# Patient Record
(demographics unavailable — no encounter records)

---

## 2024-12-06 NOTE — ADDENDUM
[FreeTextEntry1] :  EMMANUEL CUMMINGS, am scribing for and in the presence of  in the following sections: HISTORY OF PRESENT ILLNESS, PAST MEDICAL/FAMILY/SOCIAL HISTORY, REVIEW OF SYSTEMS, VITAL SIGNS, PHYSICAL EXAM, ASSESSMENT/PLAN on 11/26/2024.

## 2024-12-06 NOTE — HISTORY OF PRESENT ILLNESS
[FreeTextEntry1] :  80 year old male patient seen for evaluation of LUTS. He complains primarily of urgency and urge incontinence. He has a previous history of a vaporization of the prostate by Dr. Smith in 2018. He has an MRI from 2 years ago which shows a prostate volume of 71 cc and a PIRADS 2 lesion only. His lab work shows BUN 17, CRT 1.1 from September 2024, PSA of 5.59 from January 2024. His stream varies - sometimes it is quite slow, sometimes better. He has nocturia x3-4. Denies gross hematuria dysuria, or UTIs. His biggest complaint continues to be daytime frequency, urgency, and occasional urge incontinence.   PMHx: CAD on Plavix because of stents, bowel-related issues including constipation which he uses a fair number of prunes

## 2024-12-06 NOTE — ASSESSMENT
[FreeTextEntry1] :  80 year old male patient with BPH, previous history of laser photovaporization, bothersome LUTS primarily urgency and urge incontinence. We discussed this in detail and discussed the pathophysiology of urgency and urge incontinence. I think at this point to get further information, we would proceed with cystoscopy and video urodynamics to define if he has significant obstruction present, what the prostate looks like, and whether he has significant overactivity. We also discussed alternatives including medications and their side effects and other options including PTNS. He decided to proceed with further work-up.  All of the patient's questions were otherwise answered. Total time including chart review was 30 boone.   The submitted E/M billing level for this visit reflects the total time spent on the day of the visit including face-to-face time spent with the patient, non-face-to-face review of medical records and relevant information, documentation, and asynchronous communication with the patient after a visit via phone, email, or patients EHR portal after the visit. The medical records reviewed are either scanned into the chart or reviewed with the patient using a patients electronic medical records portal for patients with records not available to [facility] via electronic transmission platforms from other institutions and labs. Time spent counseling and performing coordination of care was also included in determining the appropriate EM billing level.   I have reviewed and verified information regarding the chief complaint and history recorded by the ancillary staff and/or the patient. I have independently reviewed and interpreted tests performed by other physicians and facilities as necessary.   I have discussed with the patient differential diagnosis, reason for auxiliary tests if ordered, risks, benefits, alternatives, and complications of each form of therapy were discussed.  I personally performed the services described in the documentation, reviewed the documentation recorded by the scribe in my presence, and it accurately and completely records my words and actions.

## 2024-12-06 NOTE — ASSESSMENT
[FreeTextEntry1] :  80 year old male patient with BPH, previous history of laser photovaporization, bothersome LUTS primarily urgency and urge incontinence. We discussed this in detail and discussed the pathophysiology of urgency and urge incontinence. I think at this point to get further information, we would proceed with cystoscopy and video urodynamics to define if he has significant obstruction present, what the prostate looks like, and whether he has significant overactivity. We also discussed alternatives including medications and their side effects and other options including PTNS. He decided to proceed with further work-up.  All of the patient's questions were otherwise answered. Total time including chart review was 30 boone.   The submitted E/M billing level for this visit reflects the total time spent on the day of the visit including face-to-face time spent with the patient, non-face-to-face review of medical records and relevant information, documentation, and asynchronous communication with the patient after a visit via phone, email, or patients EHR portal after the visit. The medical records reviewed are either scanned into the chart or reviewed with the patient using a patients electronic medical records portal for patients with records not available to [facility] via electronic transmission platforms from other institutions and labs. Time spent counseling and performing coordination of care was also included in determining the appropriate EM billing level.   I have reviewed and verified information regarding the chief complaint and history recorded by the ancillary staff and/or the patient. I have independently reviewed and interpreted tests performed by other physicians and facilities as necessary.   I have discussed with the patient differential diagnosis, reason for auxiliary tests if ordered, risks, benefits, alternatives, and complications of each form of therapy were discussed.  I personally performed the services described in the documentation, reviewed the documentation recorded by the scribe in my presence, and it accurately and completely records my words and actions.

## 2025-03-19 NOTE — PHYSICAL EXAM
[General Appearance - Well Developed] : well developed [Normal Appearance] : normal appearance [Well Groomed] : well groomed [General Appearance - Well Nourished] : well nourished [No Deformities] : no deformities [General Appearance - In No Acute Distress] : no acute distress [Normal Conjunctiva] : the conjunctiva exhibited no abnormalities [Eyelids - No Xanthelasma] : the eyelids demonstrated no xanthelasmas [Normal Oral Mucosa] : normal oral mucosa [No Oral Pallor] : no oral pallor [No Oral Cyanosis] : no oral cyanosis [Normal Jugular Venous A Waves Present] : normal jugular venous A waves present [Normal Jugular Venous V Waves Present] : normal jugular venous V waves present [No Jugular Venous Giles A Waves] : no jugular venous giles A waves [Heart Rate And Rhythm] : heart rate and rhythm were normal [Heart Sounds] : normal S1 and S2 [Murmurs] : no murmurs present [FreeTextEntry1] : edema [Respiration, Rhythm And Depth] : normal respiratory rhythm and effort [Exaggerated Use Of Accessory Muscles For Inspiration] : no accessory muscle use [Auscultation Breath Sounds / Voice Sounds] : lungs were clear to auscultation bilaterally [Bowel Sounds] : normal bowel sounds [Abdomen Soft] : soft [Abdomen Tenderness] : non-tender [Abdomen Mass (___ Cm)] : no abdominal mass palpated [Abnormal Walk] : normal gait [Nail Clubbing] : no clubbing of the fingernails [Cyanosis, Localized] : no localized cyanosis [Petechial Hemorrhages (___cm)] : no petechial hemorrhages [Skin Color & Pigmentation] : normal skin color and pigmentation [] : no rash [No Venous Stasis] : no venous stasis [Skin Lesions] : no skin lesions [No Skin Ulcers] : no skin ulcer [No Xanthoma] : no  xanthoma was observed [Oriented To Time, Place, And Person] : oriented to person, place, and time

## 2025-07-24 NOTE — CARDIOLOGY SUMMARY
[de-identified] : 7- NSR normal ECG  [de-identified] : 4-3-2025 Lexiscan stress test No ischemia .

## 2025-07-24 NOTE — PHYSICAL EXAM
[General Appearance - Well Developed] : well developed [Normal Appearance] : normal appearance [Well Groomed] : well groomed [General Appearance - Well Nourished] : well nourished [No Deformities] : no deformities [General Appearance - In No Acute Distress] : no acute distress [Normal Conjunctiva] : the conjunctiva exhibited no abnormalities [Eyelids - No Xanthelasma] : the eyelids demonstrated no xanthelasmas [Normal Oral Mucosa] : normal oral mucosa [No Oral Pallor] : no oral pallor [No Oral Cyanosis] : no oral cyanosis [Normal Jugular Venous A Waves Present] : normal jugular venous A waves present [Normal Jugular Venous V Waves Present] : normal jugular venous V waves present [No Jugular Venous Giles A Waves] : no jugular venous giles A waves [Heart Rate And Rhythm] : heart rate and rhythm were normal [Heart Sounds] : normal S1 and S2 [Murmurs] : no murmurs present [Respiration, Rhythm And Depth] : normal respiratory rhythm and effort [Exaggerated Use Of Accessory Muscles For Inspiration] : no accessory muscle use [Auscultation Breath Sounds / Voice Sounds] : lungs were clear to auscultation bilaterally [Bowel Sounds] : normal bowel sounds [Abdomen Soft] : soft [Abdomen Tenderness] : non-tender [Abdomen Mass (___ Cm)] : no abdominal mass palpated [Abnormal Walk] : normal gait [Nail Clubbing] : no clubbing of the fingernails [Cyanosis, Localized] : no localized cyanosis [Petechial Hemorrhages (___cm)] : no petechial hemorrhages [Skin Color & Pigmentation] : normal skin color and pigmentation [] : no rash [No Venous Stasis] : no venous stasis [Skin Lesions] : no skin lesions [No Skin Ulcers] : no skin ulcer [No Xanthoma] : no  xanthoma was observed [Oriented To Time, Place, And Person] : oriented to person, place, and time [FreeTextEntry1] : edema

## 2025-07-24 NOTE — HISTORY OF PRESENT ILLNESS
[FreeTextEntry1] : The patient has had epigastric and lower chest pain which he attributes to GI issues . He drinks water and it resolves. The patient has not had overt chest pain . He has history of having NSTEM in the past and had PCI of the PLV branch more than 10 years ago .  He had nonobstructive CAD of the LAD and RCA . Nuclear stress test done 2 years ago was negative for ischemia .  He has noticed swelling of his lower extremity which is more prominent during the summer months

## 2025-07-24 NOTE — ASSESSMENT
[FreeTextEntry1] : The patient has history of NSTEM , increased cholesterol who has had vague epigastric and lower chest pain uncertain etiology . The patient had assumed this was secondary to his GI issues . Cannot determine if ther is a cardiac component however . LDL is not at goal . BP is stable . He has edema.  THis is worse during the summer months .